# Patient Record
Sex: MALE
[De-identification: names, ages, dates, MRNs, and addresses within clinical notes are randomized per-mention and may not be internally consistent; named-entity substitution may affect disease eponyms.]

---

## 2018-05-13 ENCOUNTER — HOSPITAL (OUTPATIENT)
Dept: OTHER | Age: 23
End: 2018-05-13
Attending: SURGERY

## 2018-05-13 LAB
ANALYZER ANC (IANC): ABNORMAL
ANION GAP SERPL CALC-SCNC: 12 MMOL/L (ref 10–20)
APTT PPP: 25 SECONDS (ref 22–30)
APTT PPP: NORMAL S
BASE DEFICIT BLDA-SCNC: ABNORMAL MMOL/L
BASE EXCESS BLDA CALC-SCNC: 1 MMOL/L (ref 0–3)
BASOPHILS # BLD: 0 THOUSAND/MCL (ref 0–0.3)
BASOPHILS NFR BLD: 0 %
BDY SITE: ABNORMAL
BODY TEMPERATURE: 37 DEGREES
BUN SERPL-MCNC: 12 MG/DL (ref 6–20)
BUN/CREAT SERPL: 16 (ref 7–25)
CA-I BLD ISE-SCNC: 1.19 MMOL/L (ref 1.15–1.29)
CA-I BLDA-SCNC: 23 % (ref 15–23)
CALCIUM SERPL-MCNC: 9.2 MG/DL (ref 8.4–10.2)
CHLORIDE BLD-SCNC: 106 MMOL/L (ref 98–107)
CHLORIDE: 103 MMOL/L (ref 98–107)
CO2 SERPL-SCNC: 29 MMOL/L (ref 21–32)
COHGB MFR BLD: 0.8 %
CONDITION: ABNORMAL
CONDITION: ABNORMAL
CREAT SERPL-MCNC: 0.77 MG/DL (ref 0.67–1.17)
DIFFERENTIAL METHOD BLD: ABNORMAL
EOSINOPHIL # BLD: 0 THOUSAND/MCL (ref 0.1–0.5)
EOSINOPHIL NFR BLD: 0 %
ERYTHROCYTE [DISTWIDTH] IN BLOOD: 12.6 % (ref 11–15)
ETHANOL SERPL-MCNC: NORMAL MG/DL
GLUCOSE BLD-MCNC: 95 MG/DL (ref 65–99)
GLUCOSE SERPL-MCNC: 95 MG/DL (ref 65–99)
HCO3 BLDA-SCNC: 24 MMOL/L (ref 22–28)
HEMATOCRIT: 46 % (ref 39–51)
HGB BLD-MCNC: 16.1 GM/DL (ref 13–17)
HGB BLD-MCNC: 16.3 GM/DL (ref 13–17)
HOROWITZ INDEX BLD+IHG-RTO: 28 %
INR PPP: 1.1
LACTATE BLDA-MCNC: 0.6 MMOL/L
LYMPHOCYTES # BLD: 1.6 THOUSAND/MCL (ref 1–4.8)
LYMPHOCYTES NFR BLD: 13 %
MCH RBC QN AUTO: 30.4 PG (ref 26–34)
MCHC RBC AUTO-ENTMCNC: 35 GM/DL (ref 32–36.5)
MCV RBC AUTO: 86.8 FL (ref 78–100)
METHGB MFR BLD: 0.7 %
MONOCYTES # BLD: 0.7 THOUSAND/MCL (ref 0.3–0.9)
MONOCYTES NFR BLD: 6 %
NEUTROPHILS # BLD: 9.5 THOUSAND/MCL (ref 1.8–7.7)
NEUTROPHILS NFR BLD: 81 %
NEUTS SEG NFR BLD: ABNORMAL %
OXYHGB MFR BLD: 98.1 % (ref 94–98)
PCO2 BLDA: 33 MM HG (ref 35–48)
PERCENT NRBC: ABNORMAL
PH BLDA: 7.47 UNIT (ref 7.35–7.45)
PLATELET # BLD: 232 THOUSAND/MCL (ref 140–450)
PO2 BLDA: 123 MM HG (ref 83–108)
POTASSIUM BLD-SCNC: 3.9 MMOL/L (ref 3.4–5.1)
POTASSIUM SERPL-SCNC: 3.6 MMOL/L (ref 3.4–5.1)
PROTHROMBIN TIME: 11 SECONDS (ref 9.7–11.8)
PROTHROMBIN TIME: NORMAL
RBC # BLD: 5.3 MILLION/MCL (ref 4.5–5.9)
SAO2 % BLDA: 100 % (ref 95–99)
SODIUM BLD-SCNC: 139 MMOL/L (ref 135–145)
SODIUM SERPL-SCNC: 140 MMOL/L (ref 135–145)
WBC # BLD: 11.8 THOUSAND/MCL (ref 4.2–11)

## 2018-05-14 LAB
ANALYZER ANC (IANC): NORMAL
BASOPHILS # BLD: 0 THOUSAND/MCL (ref 0–0.3)
BASOPHILS NFR BLD: 0 %
DIFFERENTIAL METHOD BLD: NORMAL
EOSINOPHIL # BLD: 0.1 THOUSAND/MCL (ref 0.1–0.5)
EOSINOPHIL NFR BLD: 2 %
ERYTHROCYTE [DISTWIDTH] IN BLOOD: 12.8 % (ref 11–15)
HEMATOCRIT: 41.3 % (ref 39–51)
HGB BLD-MCNC: 14.2 GM/DL (ref 13–17)
LYMPHOCYTES # BLD: 2.3 THOUSAND/MCL (ref 1–4.8)
LYMPHOCYTES NFR BLD: 32 %
MAGNESIUM SERPL-MCNC: 2 MG/DL (ref 1.7–2.4)
MCH RBC QN AUTO: 30 PG (ref 26–34)
MCHC RBC AUTO-ENTMCNC: 34.4 GM/DL (ref 32–36.5)
MCV RBC AUTO: 87.1 FL (ref 78–100)
MONOCYTES # BLD: 0.6 THOUSAND/MCL (ref 0.3–0.9)
MONOCYTES NFR BLD: 9 %
NEUTROPHILS # BLD: 4.1 THOUSAND/MCL (ref 1.8–7.7)
NEUTROPHILS NFR BLD: 57 %
NEUTS SEG NFR BLD: NORMAL %
PERCENT NRBC: NORMAL
PHOSPHATE SERPL-MCNC: 4.2 MG/DL (ref 2.4–4.7)
PLATELET # BLD: 196 THOUSAND/MCL (ref 140–450)
RBC # BLD: 4.74 MILLION/MCL (ref 4.5–5.9)
WBC # BLD: 7.1 THOUSAND/MCL (ref 4.2–11)

## 2020-08-26 ENCOUNTER — TELEPHONE (OUTPATIENT)
Dept: ADMINISTRATIVE | Age: 25
End: 2020-08-26

## 2020-08-26 NOTE — TELEPHONE ENCOUNTER
Would like to establish as a new patient. His mother is one of your patients. Is this okay to schedule?

## 2020-08-27 NOTE — TELEPHONE ENCOUNTER
I do not have new patient slots. He can be scheduled in any available 30 minute slot--even next week there are some.  Thanks

## 2020-08-28 NOTE — TELEPHONE ENCOUNTER
Lvm for pt that we could put him in 9/2 for a new patient appt. Asked him to call back ASAP to schedule if interested.

## 2024-11-04 ENCOUNTER — HOSPITAL ENCOUNTER (EMERGENCY)
Age: 29
Discharge: HOME | End: 2024-11-04

## 2024-11-04 VITALS
SYSTOLIC BLOOD PRESSURE: 130 MMHG | HEART RATE: 66 BPM | OXYGEN SATURATION: 100 % | RESPIRATION RATE: 18 BRPM | TEMPERATURE: 98.2 F | DIASTOLIC BLOOD PRESSURE: 76 MMHG

## 2024-11-04 DIAGNOSIS — R59.0 POSTERIOR CERVICAL LYMPHADENOPATHY: ICD-10-CM

## 2024-11-04 DIAGNOSIS — K08.89 PAIN, DENTAL: Primary | ICD-10-CM

## 2024-11-04 PROCEDURE — 99211 OFF/OP EST MAY X REQ PHY/QHP: CPT

## 2024-11-04 RX ORDER — NAPROXEN 500 MG/1
500 TABLET ORAL 2 TIMES DAILY PRN
Qty: 14 TABLET | Refills: 0 | Status: SHIPPED | OUTPATIENT
Start: 2024-11-04

## 2024-11-04 NOTE — ED PROVIDER NOTES
Independent [facility] Visit.    HPI:  11/4/24,   Time: 2:43 PM HELIO Archuleta is a 29 y.o. male presenting to the ED for dental pain, facial pain, headache, ear pain and a lump behind his neck.  He is present here with his mom today.  They states this has been ongoing for a long time.  He has been on multiple rounds of antibiotics.  He follows with ENT and dermatology.  He does not have a dentist.  Complains of left upper molar pain.  No dysphagia or trismus.  No fevers.  He is currently on clindamycin prescribed by his family doctor.    ROS:   Pertinent positives and negatives are stated within HPI, all other systems reviewed and are negative.  --------------------------------------------- PAST HISTORY ---------------------------------------------  Past Medical History:  has no past medical history on file.    Past Surgical History:  has no past surgical history on file.    Social History:      Family History: family history is not on file.     The patient’s home medications have been reviewed.    Allergies: Patient has no known allergies.    -------------------------------------------------- RESULTS -------------------------------------------------  All laboratory and radiology results have been personally reviewed by myself   LABS:  No results found for this visit on 11/04/24.    RADIOLOGY:  Interpreted by Radiologist.  No orders to display       ------------------------- NURSING NOTES AND VITALS REVIEWED ---------------------------   The nursing notes within the ED encounter and vital signs as below have been reviewed.   /76   Pulse 66   Temp 98.2 °F (36.8 °C)   Resp 18   SpO2 100%   Oxygen Saturation Interpretation: Normal      ---------------------------------------------------PHYSICAL EXAM--------------------------------------      Constitutional/General: Alert and oriented x3, well appearing, non toxic in NAD  Head: NC/AT  Eyes: PERRL, EOMI  Mouth: Oropharynx clear, handling secretions, no

## 2025-02-06 ENCOUNTER — PREP FOR PROCEDURE (OUTPATIENT)
Dept: SURGERY | Age: 30
End: 2025-02-06

## 2025-02-06 ENCOUNTER — OFFICE VISIT (OUTPATIENT)
Dept: SURGERY | Age: 30
End: 2025-02-06

## 2025-02-06 VITALS
SYSTOLIC BLOOD PRESSURE: 125 MMHG | RESPIRATION RATE: 14 BRPM | OXYGEN SATURATION: 98 % | DIASTOLIC BLOOD PRESSURE: 80 MMHG | HEART RATE: 71 BPM | WEIGHT: 175 LBS | TEMPERATURE: 97.8 F

## 2025-02-06 DIAGNOSIS — F12.90 MARIJUANA SMOKER: ICD-10-CM

## 2025-02-06 DIAGNOSIS — D17.0 LIPOMA OF HEAD: Primary | ICD-10-CM

## 2025-02-06 PROBLEM — D17.9 LIPOMA: Status: ACTIVE | Noted: 2025-02-06

## 2025-02-06 PROCEDURE — 99204 OFFICE O/P NEW MOD 45 MIN: CPT | Performed by: SURGERY

## 2025-02-06 NOTE — PROGRESS NOTES
negative    Physical Exam:  Vitals:    02/06/25 0943   BP: 125/80   Pulse: 71   Resp: 14   Temp: 97.8 °F (36.6 °C)   SpO2: 98%       PSYCH: mood and affect normal, alert and oriented x 3  CONSTITUTIONAL: No apparent distress, comfortable  EYES: Sclera white, pupils equal round and reactive to light  ENMT:  Hearing normal, trachea midline, ears externally intact  Posterior scalp mass greater than9 cm    RESP: Breath sounds were clear and equal             Respiratory effort was normal   CV: Heart sounds were normal with a regular rate and rhythm.     GI/ Abdomen: The abdomen was soft and non distended.                     There was no tenderness, guarding, rebound, or rigidity.  There was no                     masses, hepatosplenomegaly, or hernias.  No inguinal hernias were noted on coughing and straining.  Rectal -deferred  MSK: no clubbing/ no cyanosis/ gait normal    Assessment & Plan   Assessment/Plan:      .   Diagnosis Orders   1. Lipoma of head        2. Marijuana smoker               I have reviewed the prior progress note from the patient's dermatology provider--dr jeffers visit on November 21, 2024  .  I have reviewed the progress note from the ED visit on November for the 2024.       -Left sub-occipital mass-referred by advanced dermatology  Greater than 9 cm in size  Patient is symptomatic  We will plan on surgical excision of the left suboccipital mass-explained to patient that this is likely a lipoma.  Explained to patient that I will plan on surgically excising this and sending it to pathology so we can obtain a tissue diagnosis.  Have discussed with the patient the risk of bleeding, infection, recurrence.  He acknowledges these risks and wishes to proceed with surgery      - Marijuana use-patient vapes marijuana-recommend cessation since it affects the lungs, affects  wound healing      Allen Mjeias MD, FACS  2/6/2025  10:13 AM     NOTE: This report was transcribed using voice recognition software.